# Patient Record
Sex: FEMALE | Race: BLACK OR AFRICAN AMERICAN | Employment: FULL TIME | URBAN - METROPOLITAN AREA
[De-identification: names, ages, dates, MRNs, and addresses within clinical notes are randomized per-mention and may not be internally consistent; named-entity substitution may affect disease eponyms.]

---

## 2019-02-11 ENCOUNTER — OFFICE VISIT (OUTPATIENT)
Dept: FAMILY MEDICINE CLINIC | Age: 44
End: 2019-02-11

## 2019-02-11 VITALS
DIASTOLIC BLOOD PRESSURE: 77 MMHG | RESPIRATION RATE: 16 BRPM | BODY MASS INDEX: 22.13 KG/M2 | HEIGHT: 68 IN | WEIGHT: 146 LBS | SYSTOLIC BLOOD PRESSURE: 118 MMHG | OXYGEN SATURATION: 99 % | HEART RATE: 80 BPM | TEMPERATURE: 97.8 F

## 2019-02-11 DIAGNOSIS — Z13.21 SCREENING FOR ENDOCRINE, NUTRITIONAL, METABOLIC AND IMMUNITY DISORDER: ICD-10-CM

## 2019-02-11 DIAGNOSIS — R53.82 CHRONIC FATIGUE: ICD-10-CM

## 2019-02-11 DIAGNOSIS — G89.29 CHRONIC PAIN OF BOTH KNEES: ICD-10-CM

## 2019-02-11 DIAGNOSIS — M22.2X2 PATELLOFEMORAL DISORDER OF BOTH KNEES: ICD-10-CM

## 2019-02-11 DIAGNOSIS — M54.50 CHRONIC LEFT-SIDED LOW BACK PAIN WITHOUT SCIATICA: ICD-10-CM

## 2019-02-11 DIAGNOSIS — G89.29 CHRONIC LEFT-SIDED LOW BACK PAIN WITHOUT SCIATICA: ICD-10-CM

## 2019-02-11 DIAGNOSIS — M22.2X1 PATELLOFEMORAL DISORDER OF BOTH KNEES: ICD-10-CM

## 2019-02-11 DIAGNOSIS — Z00.00 PHYSICAL EXAM: ICD-10-CM

## 2019-02-11 DIAGNOSIS — M25.562 CHRONIC PAIN OF BOTH KNEES: ICD-10-CM

## 2019-02-11 DIAGNOSIS — Z13.29 SCREENING FOR ENDOCRINE, NUTRITIONAL, METABOLIC AND IMMUNITY DISORDER: ICD-10-CM

## 2019-02-11 DIAGNOSIS — E55.9 VITAMIN D DEFICIENCY: Primary | ICD-10-CM

## 2019-02-11 DIAGNOSIS — D50.9 IRON DEFICIENCY ANEMIA, UNSPECIFIED IRON DEFICIENCY ANEMIA TYPE: ICD-10-CM

## 2019-02-11 DIAGNOSIS — Z13.228 SCREENING FOR ENDOCRINE, NUTRITIONAL, METABOLIC AND IMMUNITY DISORDER: ICD-10-CM

## 2019-02-11 DIAGNOSIS — Z13.0 SCREENING FOR ENDOCRINE, NUTRITIONAL, METABOLIC AND IMMUNITY DISORDER: ICD-10-CM

## 2019-02-11 DIAGNOSIS — M25.561 CHRONIC PAIN OF BOTH KNEES: ICD-10-CM

## 2019-02-11 NOTE — PROGRESS NOTES
HISTORY OF PRESENT ILLNESS Danielle Nicholas is a 37 y.o. female. Patient is here to establish care. Patient recently [facility] and mamm[facility]. 
She mentions she would like to have annual exam as she has a new insurance. She mentions she continues to have chronic fatigue but it has gotten much better than last year after taking vitamin D. She does have a dental and eye care. She mentions she was being followed by [place]  For bilateral knee pain and was getting injections from an orthopedic specialist. She was told her knees were inoperable. Patient would like to try pool aqua therapy and referral has been placed. I will also order blood work. Establish Care The history is provided by the patient. This is a new problem. The problem occurs constantly. The problem has not changed since onset. Pertinent negatives include no chest pain, no abdominal pain, no headaches and no shortness of breath. Nothing aggravates the symptoms. She has tried nothing for the symptoms. Knee Pain The history is provided by the patient. This is a chronic problem. The problem occurs constantly. The problem has been gradually worsening. Pertinent negatives include no chest pain, no abdominal pain, no headaches and no shortness of breath. Review of Systems Constitutional: Positive for malaise/fatigue. Negative for chills and fever. HENT: Negative for congestion, ear discharge, ear pain, hearing loss and sore throat. Eyes: Negative for blurred vision, double vision, pain and discharge. Respiratory: Negative for cough, sputum production, shortness of breath and wheezing. Cardiovascular: Negative for chest pain, palpitations and leg swelling. Gastrointestinal: Negative for abdominal pain, blood in stool, constipation, nausea and vomiting. Genitourinary: Negative for dysuria, frequency and hematuria. Musculoskeletal: Negative for back pain and myalgias. Neurological: Negative for tingling, tremors, focal weakness, weakness and headaches. Endo/Heme/Allergies: Negative for environmental allergies. Psychiatric/Behavioral: Negative for depression. The patient is not nervous/anxious. Physical Exam  
Constitutional: She is oriented to person, place, and time. She appears well-developed and well-nourished. No distress. HENT:  
Head: Normocephalic and atraumatic. Right Ear: External ear normal.  
Left Ear: External ear normal.  
Mouth/Throat: Oropharynx is clear and moist. No oropharyngeal exudate. Eyes: EOM are normal. Pupils are equal, round, and reactive to light. No scleral icterus. Neck: Normal range of motion. No thyromegaly present. Cardiovascular: Normal rate, regular rhythm and normal heart sounds. Pulmonary/Chest: Effort normal and breath sounds normal. No respiratory distress. She has no wheezes. Abdominal: Soft. Bowel sounds are normal. She exhibits no distension. There is no tenderness. Lymphadenopathy:  
  She has no cervical adenopathy. Neurological: She is alert and oriented to person, place, and time. Psychiatric: She has a normal mood and affect. ASSESSMENT and PLAN Physical exam : 
1) Please make sure you have a routine physical exam every 1-2 years. 2) Annual check up with eye doctor and dentist. 
3) Annual mammograms for all females starting at age of 36. 
3) Self breast exam every month starting at age of 21 and above. 5) Clinical breast exam to be done every 3 years for woman between 20-30 and every year for all woman 36 and above. 6) Pap smear every 3 years starting at age 24( between 24- 27 it may be more often). At the age of 27 to 72  can switch to every 5 years with HPV screening. Woman over the age of 72 with regular cervical cancer testing with normal results no longer need testing. 7) Colorectal cancer screening with colonoscopy every ten years. 8) Bone density testing starting at the age of 72. 9) Routine blood work to be ordered as part of physical exam and has been discussed with patient. 10) Screening for STD's/HIV. 11) Exercise at least 30 min 3-5 times a week for goal BMI of less than or equal to 25. 
12) Please make sure you wear a seat belt while driving daily , helmet safety discussed. 13) Please avoid smoking , alcohol and illicit drug use. 14) Daily requirement of calcium is 1200 mg per day and 1000 IU of vitamin D. 
15) Please make sure all immunizations are up to date: - Influenza vaccine every year  
     - Tdap every 10 years - Pneumococcal vaccine starting at age of 72 
     - Shingles at age 61 Bilateral knee pain : 
- Please sign release for old records 
- referral to physical therapy Chronic fatigue: 
- Patient will return on Friday for blood work

## 2019-02-15 ENCOUNTER — HOSPITAL ENCOUNTER (OUTPATIENT)
Dept: LAB | Age: 44
Discharge: HOME | End: 2019-02-15
Payer: COMMERCIAL

## 2019-02-15 DIAGNOSIS — Z13.228 SCREENING FOR ENDOCRINE, NUTRITIONAL, METABOLIC AND IMMUNITY DISORDER: ICD-10-CM

## 2019-02-15 DIAGNOSIS — E55.9 VITAMIN D DEFICIENCY: ICD-10-CM

## 2019-02-15 DIAGNOSIS — Z13.29 SCREENING FOR ENDOCRINE, NUTRITIONAL, METABOLIC AND IMMUNITY DISORDER: ICD-10-CM

## 2019-02-15 DIAGNOSIS — Z13.21 SCREENING FOR ENDOCRINE, NUTRITIONAL, METABOLIC AND IMMUNITY DISORDER: ICD-10-CM

## 2019-02-15 DIAGNOSIS — D50.9 IRON DEFICIENCY ANEMIA, UNSPECIFIED IRON DEFICIENCY ANEMIA TYPE: ICD-10-CM

## 2019-02-15 DIAGNOSIS — R53.82 CHRONIC FATIGUE: ICD-10-CM

## 2019-02-15 DIAGNOSIS — Z13.0 SCREENING FOR ENDOCRINE, NUTRITIONAL, METABOLIC AND IMMUNITY DISORDER: ICD-10-CM

## 2019-02-15 LAB
ALBUMIN SERPL-MCNC: 3.7 G/DL (ref 3.4–5)
ALBUMIN/GLOB SERPL: 0.8 {RATIO} (ref 0.8–1.7)
ALP SERPL-CCNC: 113 U/L (ref 45–117)
ALT SERPL-CCNC: 25 U/L (ref 13–56)
ANION GAP SERPL CALC-SCNC: 9 MMOL/L (ref 3–18)
AST SERPL-CCNC: 20 U/L (ref 15–37)
BASOPHILS # BLD: 0 K/UL (ref 0–0.1)
BASOPHILS NFR BLD: 0 % (ref 0–2)
BILIRUB SERPL-MCNC: 0.7 MG/DL (ref 0.2–1)
BUN SERPL-MCNC: 9 MG/DL (ref 7–18)
BUN/CREAT SERPL: 13 (ref 12–20)
CALCIUM SERPL-MCNC: 9.1 MG/DL (ref 8.5–10.1)
CHLORIDE SERPL-SCNC: 107 MMOL/L (ref 100–108)
CHOLEST SERPL-MCNC: 222 MG/DL
CO2 SERPL-SCNC: 26 MMOL/L (ref 21–32)
CREAT SERPL-MCNC: 0.72 MG/DL (ref 0.6–1.3)
DIFFERENTIAL METHOD BLD: ABNORMAL
EOSINOPHIL # BLD: 0.1 K/UL (ref 0–0.4)
EOSINOPHIL NFR BLD: 4 % (ref 0–5)
ERYTHROCYTE [DISTWIDTH] IN BLOOD BY AUTOMATED COUNT: 14.2 % (ref 11.6–14.5)
GLOBULIN SER CALC-MCNC: 4.5 G/DL (ref 2–4)
GLUCOSE SERPL-MCNC: 81 MG/DL (ref 74–99)
HCT VFR BLD AUTO: 41.5 % (ref 35–45)
HDLC SERPL-MCNC: 63 MG/DL (ref 40–60)
HDLC SERPL: 3.5 {RATIO} (ref 0–5)
HGB BLD-MCNC: 12.7 G/DL (ref 12–16)
IRON SATN MFR SERPL: 27 %
IRON SERPL-MCNC: 86 UG/DL (ref 50–175)
LDLC SERPL CALC-MCNC: 142.4 MG/DL (ref 0–100)
LIPID PROFILE,FLP: ABNORMAL
LYMPHOCYTES # BLD: 1.1 K/UL (ref 0.9–3.6)
LYMPHOCYTES NFR BLD: 40 % (ref 21–52)
MCH RBC QN AUTO: 27.9 PG (ref 24–34)
MCHC RBC AUTO-ENTMCNC: 30.6 G/DL (ref 31–37)
MCV RBC AUTO: 91 FL (ref 74–97)
MONOCYTES # BLD: 0.2 K/UL (ref 0.05–1.2)
MONOCYTES NFR BLD: 9 % (ref 3–10)
NEUTS SEG # BLD: 1.3 K/UL (ref 1.8–8)
NEUTS SEG NFR BLD: 47 % (ref 40–73)
PLATELET # BLD AUTO: 274 K/UL (ref 135–420)
PMV BLD AUTO: 11.5 FL (ref 9.2–11.8)
POTASSIUM SERPL-SCNC: 4.2 MMOL/L (ref 3.5–5.5)
PROT SERPL-MCNC: 8.2 G/DL (ref 6.4–8.2)
RBC # BLD AUTO: 4.56 M/UL (ref 4.2–5.3)
SODIUM SERPL-SCNC: 142 MMOL/L (ref 136–145)
TIBC SERPL-MCNC: 320 UG/DL (ref 250–450)
TRIGL SERPL-MCNC: 83 MG/DL (ref ?–150)
TSH SERPL DL<=0.05 MIU/L-ACNC: 2.9 UIU/ML (ref 0.36–3.74)
VIT B12 SERPL-MCNC: 738 PG/ML (ref 211–911)
VLDLC SERPL CALC-MCNC: 16.6 MG/DL
WBC # BLD AUTO: 2.7 K/UL (ref 4.6–13.2)

## 2019-02-15 PROCEDURE — 80053 COMPREHEN METABOLIC PANEL: CPT

## 2019-02-15 PROCEDURE — 82607 VITAMIN B-12: CPT

## 2019-02-15 PROCEDURE — 82306 VITAMIN D 25 HYDROXY: CPT

## 2019-02-15 PROCEDURE — 84443 ASSAY THYROID STIM HORMONE: CPT

## 2019-02-15 PROCEDURE — 80061 LIPID PANEL: CPT

## 2019-02-15 PROCEDURE — 85025 COMPLETE CBC W/AUTO DIFF WBC: CPT

## 2019-02-15 PROCEDURE — 83540 ASSAY OF IRON: CPT

## 2019-02-16 LAB — 25(OH)D3 SERPL-MCNC: 75.7 NG/ML (ref 30–100)

## 2019-02-19 ENCOUNTER — HOSPITAL ENCOUNTER (OUTPATIENT)
Dept: PHYSICAL THERAPY | Age: 44
Discharge: HOME | End: 2019-02-19
Payer: COMMERCIAL

## 2019-02-19 PROCEDURE — 97162 PT EVAL MOD COMPLEX 30 MIN: CPT

## 2019-02-19 NOTE — PROGRESS NOTES
[address] PHYSICAL THERAPY  [address] [place] [address] [address], [place], [address] [employer] [address] - Phone: [phone]  Fax: [phone] / [facility]  Patient Name: Nicki Halsted : 1975   Medical   Diagnosis: Bilateral knee pain [M25.561, M25.562]  Chronic left-sided low back pain without sciatia [M54.5, G89.29] Treatment Diagnosis: Bilateral knee pain [M25.561, M25.562]  Chronic left-sided low back pain without sciatia [M54.5, G89.29]   Onset Date: Chronic - B knee pain; 6+ months - left-sided low back pain     Referral Source: Brianna Villalpando MD [facility]): 2019   Prior Hospitalization: See medical history Provider #: [id number]   Prior Level of Function: Independent with ADLs, ambulation; working only 6 hours per day due to B knee pain   Comorbidities: Umbilical hernia, h/o concussion    Medications: Verified on Patient Summary List   The Plan of Care and following information is based on the information from the initial evaluation.   ===========================================================================================  Assessment / key information:  Patient is a 37 y.o. female who presents with complaints of chronic B knee pain (since ) and recent onset left-sided low back pain (6-8 months ago). Patient demonstrates decreased L/S flexion ROM, decreased B quad/hip flexor flexibility, decreased B hip strength, B patellar hypermobility/crepitus, decreased position/activity tolerance and impaired functional mobility/gait. Patient would benefit from skilled PT services to address these issues and improve function.   Thank you for this referral.    FOTO: 53/100, stage 5, independent community ambulator (knee); 69/100 (stage 4, little difficulty performing usual work or household activities and hobbies)   ==========================================================================================  Eval Complexity: History: MEDIUM  Complexity : 1-2 comorbidities / personal factors will impact the outcome/ POC Exam:HIGH Complexity : 4+ Standardized tests and measures addressing body structure, function, activity limitation and / or participation in recreation  Presentation: MEDIUM Complexity : Evolving with changing characteristics  Clinical Decision Making:MEDIUM Complexity : FOTO score of 26-74Overall Complexity:MEDIUM    Problem List: pain affecting function, decrease ROM, decrease strength, edema affecting function, impaired gait/ balance, decrease ADL/ functional abilitiies, decrease activity tolerance, decrease flexibility/ joint mobility and decrease transfer abilities   Treatment Plan may include any combination of the following: Therapeutic exercise, Therapeutic activities, Neuromuscular re-education, Physical agent/modality, Gait/balance training, Manual therapy, Aquatic therapy, Patient education, Functional mobility training and Stair training  Patient / Family readiness to learn indicated by: asking questions, trying to perform skills and interest  Persons(s) to be included in education: patient (P)  Barriers to Learning/Limitations: yes;  other unable to initiate PT until 3/7/2019 due to patient's work schedule  Measures taken:    Patient Goal (s): \"Some improvement for daily living activities in range of motion and pain. \"   Patient self reported health status: good  Rehabilitation Potential: fair   Short Term Goals: To be accomplished in  2  weeks:  1. Patient will demonstrate compliance with aquatic therapy program.  2. Patient will report less than or equal to 4/10 pain at worst during aquatic therapy to allow increased activity tolerance.  Long Term Goals: To be accomplished in  4  weeks:  1. Patient will demonstrate independence with aquatic therapy program.  2. Patient will score greater than or equal to 71/100 on FOTO (lumbar spine) to indicate improved function.   3. Patient will score greater than or equal to 61/100 on FOTO (knee) to indicate improved function. Frequency / Duration:   Patient to be seen  2-3  times per week for 4  weeks:  Patient / Caregiver education and instruction: other pool rules    Therapist Signature: Kalee Jaime PT Date: 5/50/8957   Certification Period: NA Time: 12:07 PM   ===========================================================================================  I certify that the above Physical Therapy Services are being furnished while the patient is under my care. I agree with the treatment plan and certify that this therapy is necessary. Physician Signature:        Date:       Time:     Please sign and return to [employer] or you may fax the signed copy to [phone]. Thank you.

## 2019-02-19 NOTE — PROGRESS NOTES
PHYSICAL THERAPY - DAILY TREATMENT NOTE    Patient Name: Ana Valencia        Date: 2019  : 1975   YES Patient  Verified  Visit #:   1     Insurance: Payor: Otf Vieyra / Plan: [id number] / Product Type: Managed Care Medicaid /      In time: 8:35 Out time: 9:30   Total Treatment Time: 55     Medicare/[employer] [place] Time Tracking (below)   Total Timed Codes (min):  NA 1:1 Treatment Time:  NA     TREATMENT AREA =  B knee pain, LBP    SUBJECTIVE    Pain Level (on 0 to 10 scale):  6  / 10 right knee, 3/10 left knee   Medication Changes/New allergies or changes in medical history, any new surgeries or procedures? NO    If yes, update Summary List   Subjective Functional Status/Changes:  []  No changes reported     Pt reports B knee pain, limited ROM (especially with bending/stooping), since she was in [employer] (began 1998). Pt reports no specific injury. Pt reports that her status was dropped due to slow running, which she attributes to her patellofemoral disorder. Pt reports difficulty standing, unable to work longer than 6 hours due to pain. Pt reports that pain is constant, but is worse with the more she tries to do, especially bending. Pt reports that she received injections right knee at [facility]. Pt reports that she was told that right knee was worse on x-ray, but pain is worse in left knee. Pt reports that she was told that she was not a candidate for surgery because of the shape of her knees. Pt reports that aquatherapy was recommended. Pt reports that she also has left-sided LBP, which began in last 6-8 months. Pt denies pain radiating down left LE, denies numbness/tingling. Pt reports that LBP is mainly tenderness to touch. OBJECTIVE    Physical Therapy Evaluation - Lumbar Spine ([facility])    SUBJECTIVE  Chief Complaint: See above    Mechanism of injury: No known injury    Symptoms:  Pain rating (0-10):    Today: 0   Best: 0   Worst: 5-6    [] Constant:    [x] Intermittent: Left LBP     Aggravated by:   [x] Bending [x] Sitting [x] Standing (prolonged) [] Walking   [] Moving [] Cough [] Sneeze [] Valsalva   [] AM  [x] PM  Lying:  [] sup   [] pro   [x] sidelying (left) [] Other:     Eased by:    [] Bending [] Sitting [] Standing [x] Walking   [x] Moving [] AM  [] PM  Lying: [] sup  [] pro  [] sidelying   [] Other:     General Health:  Red Flags Indicated? [] Yes    [x] No  [] Yes [] No Recent weight change (If yes, due to dieting?  [] Yes  [] No)  [] Yes [] No Weakness in legs during walking  [] Yes [] No Unremitting pain at night  [] Yes [] No Abdominal pain or problems  [] Yes [] No Rectal bleeding  [] Yes [] No Feet more cold or painful in cold weather  [] Yes [] No Menstrual irregularities  [] Yes [] No Blood or pain with urination  [] Yes [] No Dysfunction of bowel or bladder  [] Yes [] No Recent illness within past 3 weeks (i.e, cold, flu)  [] Yes [] No Numbness/tingling in buttock/genitalia region    Past History/Treatments: None    Diagnostic Tests: [] Lab work [] X-rays    [] CT [] MRI     [] Other:  Results: None    Functional Status  Prior level of function: Independent with ADLs, ambulation; working 6 hours/day (limited due to knee pain)  Present functional limitations: Prolonged standing (limited due to knee pain)  What position do you sleep in?: Supine    OBJECTIVE  Posture:  Lateral Shift: [] R    [] L     [] +  [x] -  Kyphosis: [] Increased [] Decreased   [x]  WNL  Lordosis:  [] Increased [] Decreased   [x] WNL  Pelvic symmetry: [] WNL    [x] Other: Left ASIS higher, left LE shorter in supine; left PSIS higher in prone  + scoliosis    Gait:  [] Normal     [x] Abnormal: Decreased gait speed    Active Movements: [] N/A   [] Too acute   [] Other:  ROM % AROM % PROM Comments:pain, area   Forward flexion 40-60 75%  Incr LBP   Extension 20-30 WNL  Incr LBP   SB right 20-30 WNL  Incr left LBP   SB left 20-30 WNL  Incr right LBP   Rotation right 5-10 WNL  Incr left LBP   Rotation left 5-10 WNL  NE     Repeated Movements   Effects on present pain: produces (DC), abolishes (A), increases (incr), decreases (decr), centralizes (C), peripheral (PH), no effect (NE)   Pre-Test Sx Flexion Repeated Flexion Extension Repeated Extension Repeated SBL Repeated SBR   Sitting          Standing          Lying      N/A N/A   Comments:  Side Glide:  Sustained passive positioning test:    Neuro Screen [x] WNL  Myotome/Dermatome/Reflexes:  Comments:    Dural Mobility:  SLR Sitting: [] R    [] L    [] +    [] -  @ (degrees):           Supine: [] R    [] L    [] +    [x] -  @ (degrees):   Slump Test: [] R    [] L    [] +    [] -  @ (degrees):   Prone Knee Bend: [] R    [] L    [] +    [] -     Palpation  [x] Min  [] Mod  [] Severe    Location: Left L/S paraspinals  [] Min  [] Mod  [] Severe    Location:  [] Min  [] Mod  [] Severe    Location:    Stabilization Tests  Multifidus Test  Level 1: Prone abdominal draw in (Goal 6-10mmHG):  Level 2: Supported leg load supine (needle deflection at 40mmHG): [] Yes  [] No   Level 3: Unsupported leg load supine (needle deflection at 40mmHG): [] Yes  [] No     Strength   L(0-5) R (0-5) N/T   Hip Flexion (L1,2) 4 4 []   Knee Extension (L3,4) 5 5 []   Ankle Dorsiflexion (L4) 5 5 []   Great Toe Extension (L5) 5 5 []   Ankle Plantarflexion (S1) 5 5 []   Knee Flexion (S1,2) 5 5 []   Upper Abdominals   [x]   Lower Abdominals   [x]   Paraspinals   [x]   Back Rotators   [x]   Gluteus Jose 4- 4- []   Other (Gluteus Medius) 4- 4- []     Special Tests  Lumbar:  Lumb.  Compression: [] Pos  [] Neg               Lumbar Distraction:   [] Pos  [] Neg    Quadrant:  [] Pos  [] Neg   [] Flex  [] Ext    Sacroilliac:  Gaenslen's: [] R    [] L    [] +    [] -     Compression: [] +    [] -     Gapping:  [] +    [] -     Thigh Thrust: [] R    [] L    [] +    [] -     Leg Length: [] +    [] - Position:    Crests:    ASIS:    PSIS:    Sacral Sulcus:    Mobility: Standing flex:     Sitting flex:     Supine to sit:     Prone knee bend:         Hip: Valiant Maupin:  [] R    [] L    [] +    [] -     Scour:  [] R    [] L    [] +    [] -     Piriformis: [] R    [] L    [] +    [] -          Deficits: Shlomo's: [] R    [] L    [] +    [x] -     Jacquetta Rubins: [] R    [] L    [x] +    [] -     Hamstrings 90/90: WNL    Gastrocsoleus (to neutral): Right: WNL Left: WNL       Global Muscular Weakness:  Abdominals:  Quadratus Lumborum:  Paraspinals:   Other:    Other tests/comments:    Physical Therapy Evaluation - Knee    ROM / Strength  [] Unable to assess                  AROM                      PROM                   Strength (1-5)    Left Right Left Right Left Right   Hip Flexion [facility] WFL   4 4    Extension [facility]   4- 4-    Abduction WFL WFL   4- 4-    Adduction WFL WFL   NT NT   Knee Flexion 140 140   5 5    Extension 0 0   5 5   Ankle Plantarflexion WFL WFL   5 5    Dorsiflexion WFL WFL   5 5       Flexibility: [] Unable to assess at this time  Hamstrings:    (L) Tightness= [x] WNL   [] Min   [] Mod   [] Severe    (R) Tightness= [x] WNL   [] Min   [] Mod   [] Severe  Quadriceps:    (L) Tightness= [x] WNL   [] Min   [] Mod   [] Severe    (R) Tightness= [x] WNL   [] Min   [] Mod   [] Severe  Gastroc:      (L) Tightness= [x] WNL   [] Min   [] Mod   [] Severe    (R) Tightness= [x] WNL   [] Min   [] Mod   [] Severe  Other:    Palpation:   Neg/Pos  Neg/Pos  Neg/Pos   Joint Line - Quad tendon - Patellar ligament -   Patella + R Fibular head - Pes Anserinus -   Tibial tubercle - Hamstring tendons - Infrapatellar fat pad -     Optional Tests:  Patellar Positioning (Static)   []L []R Normal []L []R Lateral   []L []R Jackson Ditto      []L []R Medial   []L []R Baja    Patellar Tracking   []L []R Glide (Lat)   []L []R Tilt (Lat)     []L []R Glide (Med)  []L []R Tilt (Med)      []L []R Tile (Inf)     Patellar Mobility   [x]L [x]R Hypermobile []L []R Hypomobile         Girth Measurements:     Cm at  Cm above joint line   Cm at   Cm below joint line  Cm at joint line   Left        Right           Lachmans  [] Neg    [] Pos Posterior Drawer [] Neg    [] Pos  Pivot Shift  [] Neg    [] Pos Posterior Sag  [] Neg    [] Pos  YASMEEN   [] Neg    [] Pos Jackie's Test [] Neg    [] Pos  ALRI   [] Neg    [] Pos Squat   [] Neg    [] Pos  Valgus@ 0 Degrees [] Neg    [] Pos Maxime-Pratik [] Neg    [] Pos  Valgus@ 30 Degrees [] Neg    [] Pos Patellar Apprehension [] Neg    [] Pos  Varus@ 0 Degrees [] Neg    [] Pos Tucker's Compression [] Neg    [] Pos  Varus@ 30 Degrees [] Neg    [] Pos Ely's Test  [] Neg    [] Pos  Apley's Compression [] Neg    [] Pos Shlomo's Test  [] Neg    [] Pos  Apley's Distraction [] Neg    [] Pos Stroke Test  [] Neg    [] Pos   Anterior Drawer [] Neg    [] Pos Fluctuation Test [] Neg    [] Pos  Other:                  [] Neg    [] Pos                 Other tests/comments:     During eval min Patient Education:  NO  Reviewed HEP   []  Progressed/Changed HEP based on:   Reviewed pool rules     Other Objective/Functional Measures:    See eval     Post Treatment Pain Level (on 0 to 10) scale:   6  / 10 right knee, 3/10 left knee     ASSESSMENT    Assessment/Changes in Function:     See plan of care    Justification for Eval Code Complexity:  Patient History : MEDIUM - umbilical hernia, h/o concussion  Examination HIGH - See objective  Clinical Presentation: MEDIUM  Clinical Decision Making : MEDIUM - FOTO 69/100 (lumbar spine), 53/100 (knee)     []  See Progress Note/Recertification   Patient will continue to benefit from skilled PT services: see plan of care   Progress toward goals / Updated goals:    See plan of care     PLAN    [x]  Upgrade activities as tolerated YES Continue plan of care   []  Discharge due to :    []  Other:      Therapist: Stan Suero PT    Date: 2/19/2019 Time: 8:42 AM

## 2019-02-21 ENCOUNTER — TELEPHONE (OUTPATIENT)
Dept: FAMILY MEDICINE CLINIC | Age: 44
End: 2019-02-21

## 2019-03-07 ENCOUNTER — HOSPITAL ENCOUNTER (OUTPATIENT)
Dept: PHYSICAL THERAPY | Age: 44
Discharge: HOME | End: 2019-03-07
Payer: COMMERCIAL

## 2019-03-07 PROCEDURE — 97113 AQUATIC THERAPY/EXERCISES: CPT

## 2019-03-07 NOTE — PROGRESS NOTES
PHYSICAL THERAPY - DAILY TREATMENT NOTE - AQUATICS    Patient Name: Theresa Cea        Date: 3/7/2019  : 1975   YES Patient  Verified  Visit #:   2     Insurance: Payor: Otf Vieyra / Plan: [id number] / Product Type: Managed Care Medicaid /      In time: 158 Out time: 252   Total Treatment Time: 54     Medicare/[facility] Time Tracking (below)   Total Timed Codes (min):  NA 1:1 Treatment Time:  Na     TREATMENT AREA =  Bilateral knee pain [M25.561, M25.562]  Chronic left-sided low back pain without sciatia [M54.5, G89.29]    SUBJECTIVE    Pain Level (on 0 to 10 scale):  5  / 10   Medication Changes/New allergies or changes in medical history, any new surgeries or procedures? NO    If yes, update Summary List   Subjective Functional Status/Changes:  []  No changes reported     Pt arrive to therapy early and was provided a tour of pool before session. She reports 5/10 pain today in her knees primarily. OBJECTIVE      52 min Therapeutic Exercise:  [x]  AQUATIC THERAPY   Rationale:      increase strength, increase ROM, and improve balance to improve the patients ability to perform ADL's and ambulate with less pain and increase activity tolerance.         [x]   Patient Education:  Continue Aquatic Therapy and HEP as instructed          UE exercise resistance:                                                  LE exercises:                                                                 [] none                                                                             []  thera-band resistance       [] small water weights                                                   [] no UE support       [x] medium water weights                                              []  1 UE support        [] large water weights                                                   [x]  2 UE support          [] back supported on wall       [] thera-band      SLS UE support: [x] both UE                          [] 1 UE       [] 1 finger/fingers       []  none       [] EC   Post Treatment Pain Level (on 0 to 10) scale:   5  / 10     ASSESSMENT    Assessment/Changes in Function:     Patient tolerated initiaton of aquatic program well without increases in pain after session. []  See Progress Note/Recertification   Patient will continue to benefit from skilled PT services to analyze,, cue,, progress,, modify,, demonstrate,, instruct, and address, movement patterns,, therapeutic interventions,, postural abnormalities,, soft tissue restrictions,, ROM,, strength,, functional mobility,, body mechanics/ergonomics, and home and community integration, to attain remaining goals. Progress toward goals / Updated goals:    1st session since initial evaluation, no notable progress yet.      PLAN    []  Upgrade activities as tolerated YES Continue plan of care   []  Discharge due to :    []  Other:      Therapist: Sumanth Terrazas DPT    Date: 3/7/2019 Time: 3:02 PM     Future Appointments   Date Time Provider Naeem Ramírez   3/8/2019 11:00 AM [facility]   3/14/2019  2:00 PM [address] [place] [address]   3/15/2019 11:00 AM [facility]   3/21/2019  2:00 PM [address] [place] [address]   3/22/2019 11:00 AM [facility]   3/28/2019  2:00 PM [address] [place] [address]   3/29/2019 11:00 AM [facility]

## 2019-03-08 ENCOUNTER — HOSPITAL ENCOUNTER (OUTPATIENT)
Dept: PHYSICAL THERAPY | Age: 44
Discharge: HOME | End: 2019-03-08
Payer: COMMERCIAL

## 2019-03-08 PROCEDURE — 97113 AQUATIC THERAPY/EXERCISES: CPT

## 2019-03-08 NOTE — PROGRESS NOTES
PHYSICAL THERAPY - DAILY TREATMENT NOTE - AQUATICS    Patient Name: Sukhdeep Lilly        Date: 3/8/2019  : 1975   YES Patient  Verified  Visit #:   3     Insurance: Payor: Otf Vieyra / Plan: [address] / Product Type: Managed Care Medicaid /      In time: 11:15 Out time: 11:55   Total Treatment Time: 40     Medicare/BCBS [place] Time Tracking (below)   Total Timed Codes (min):  na 1:1 Treatment Time:  na     TREATMENT AREA =  Bilateral knee pain [M25.561, M25.562]  Chronic left-sided low back pain without sciatia [M54.5, G89.29]    SUBJECTIVE    Pain Level (on 0 to 10 scale):  5  / 10   Medication Changes/New allergies or changes in medical history, any new surgeries or procedures? NO    If yes, update Summary List   Subjective Functional Status/Changes:  []  No changes reported     Pt reports feeling okay after starting aquatic PT yesterday. Pt states she got a flat tire that is why she is late. OBJECTIVE  40  (25) min Therapeutic Exercise:   [x]  Aquatic Therapy   Rationale:      increase strength, increase ROM, and improve balance to improve the patients ability to perform ADL's and ambulate with less pain and increase activity tolerance.       [x]   Patient Education:  Continue Aquatic Therapy and HEP as instructed     UE exercise resistance:                                                  LE exercises:                                                                 [] none                                                                             []  thera-band resistance       [] small water weights                                                   [] no UE support       [x] medium water weights                                              [x]  1 UE support        [] large water weights                                                   [x]  2 UE support          [] back supported on wall       [] thera-band      SLS UE support: [] both UE                          [] 1 UE       [] 1 finger/fingers       []  none       [] EC     Post Treatment Pain Level (on 0 to 10) scale:   5  / 10     Other  Pt arrived 13' late. Mod VCing for posture during ex, especially during UE ex. Pt c/o \"burning\" right knee at end of session. ASSESSMENT    Assessment/Changes in Function:     Pt with good tolerance to aquatic ex, but no change in pain level reported. []  See Progress Note/Recertification   Patient will continue to benefit from skilled PT services to  analyze,, cue,, progress,, modify,, demonstrate,, instruct, and address, movement patterns,, therapeutic interventions,, postural abnormalities,, soft tissue restrictions,, ROM,, strength,, functional mobility,, body mechanics/ergonomics, and home and community integration, to attain remaining goals. Progress toward goals / Updated goals:    1. Patient will demonstrate compliance with aquatic therapy program. Pt has participated in 2 aquatic sessions   2. Patient will report less than or equal to 4/10 pain at worst during aquatic therapy to allow increased activity tolerance.          PLAN    []  Upgrade activities as tolerated YES Continue plan of care   []  Discharge due to :    []  Other:      Therapist: George Barnhart PTA    Date: 3/8/2019 Time: 4:05 PM     Future Appointments   Date Time Provider Naeem Ramírez   3/14/2019  2:00 PM [address] [place] [address]   3/15/2019 11:00 AM [facility]   3/21/2019  2:00 PM [address] [place] [address]   3/22/2019 11:00 AM [facility]   3/28/2019  2:00 PM [address] [place] [address]   3/29/2019 11:00 AM [facility]

## 2019-03-14 ENCOUNTER — APPOINTMENT (OUTPATIENT)
Dept: PHYSICAL THERAPY | Age: 44
End: 2019-03-14
Payer: COMMERCIAL

## 2019-03-15 ENCOUNTER — HOSPITAL ENCOUNTER (OUTPATIENT)
Dept: PHYSICAL THERAPY | Age: 44
Discharge: HOME | End: 2019-03-15
Payer: COMMERCIAL

## 2019-03-15 PROCEDURE — 97113 AQUATIC THERAPY/EXERCISES: CPT

## 2019-03-15 NOTE — PROGRESS NOTES
PHYSICAL THERAPY - DAILY TREATMENT NOTE - AQUATICS    Patient Name: Chelsi Quiroz        Date: 3/15/2019  : 1975   YES Patient  Verified  Visit #:   4     Insurance: Payor: Otf Vieyra / Plan: [address] / Product Type: Managed Care Medicaid /      In time: 11:00 Out time: 11:55   Total Treatment Time: 55     Medicare/BCBS [place] Time Tracking (below)   Total Timed Codes (min):  na 1:1 Treatment Time:  na     TREATMENT AREA =  Bilateral knee pain [M25.561, M25.562]  Chronic left-sided low back pain without sciatia [M54.5, G89.29]    SUBJECTIVE    Pain Level (on 0 to 10 scale):  2  / 10   Medication Changes/New allergies or changes in medical history, any new surgeries or procedures? NO    If yes, update Summary List   Subjective Functional Status/Changes:  []  No changes reported     \"How can I be weak? I was in the [address] [place] [address]. I guess I stopped doing it (exercise) though. \"       OBJECTIVE  55  (30) min Therapeutic Exercise:   [x]  Aquatic Therapy   Rationale:      increase strength, increase ROM, and improve balance to improve the patients ability to perform ADL's and ambulate with less pain and increase activity tolerance.       [x]   Patient Education:  Continue Aquatic Therapy and HEP as instructed     UE exercise resistance:                                                  LE exercises:                                                                 [] none                                                                             []  thera-band resistance       [] small water weights                                                   [] no UE support       [x] medium water weights                                              [x]  1 UE support        [] large water weights                                                   [x]  2 UE support          [] back supported on wall       [] thera-band      SLS UE support:                      [] both UE [] 1 UE       [] 1 finger/fingers       []  none       [] EC     Post Treatment Pain Level (on 0 to 10) scale:   2  / 10     Other  Mod VCing for posture and form with aquatic ex. No c/o pain during session. ASSESSMENT    Assessment/Changes in Function:     Pt tolerating aquatic therapeutic exercise well, but no change in pain/sx's reported. []  See Progress Note/Recertification   Patient will continue to benefit from skilled PT services to analyze,, cue,, progress,, modify,, demonstrate,, instruct, and address, movement patterns,, therapeutic interventions,, postural abnormalities,, soft tissue restrictions,, ROM,, strength,, functional mobility,, body mechanics/ergonomics, and home and community integration, to attain remaining goals. Progress toward goals / Updated goals:    1. Patient will demonstrate compliance with aquatic therapy program. Pt attending aquatics consistently. 2. Patient will report less than or equal to 4/10 pain at worst during aquatic therapy to allow increased activity tolerance. Pain has ranged from 2-5/10 past sessions      PLAN    []  Upgrade activities as tolerated YES Continue plan of care   []  Discharge due to :    []  Other:      Therapist: Татьяна Miranda PTA    Date: 3/15/2019 Time: 3:56 PM     Future Appointments   Date Time Provider Naeem Ramírez   3/21/2019  2:00 PM [address] [place] [address]   3/22/2019 11:00 AM [facility]   3/28/2019  2:00 PM [address] [place] [address]   3/29/2019 11:00 AM [facility]

## 2019-03-21 ENCOUNTER — HOSPITAL ENCOUNTER (OUTPATIENT)
Dept: PHYSICAL THERAPY | Age: 44
Discharge: HOME | End: 2019-03-21
Payer: COMMERCIAL

## 2019-03-21 PROCEDURE — 97113 AQUATIC THERAPY/EXERCISES: CPT

## 2019-03-21 NOTE — PROGRESS NOTES
PHYSICAL THERAPY - DAILY TREATMENT NOTE - AQUATICS    Patient Name: Adri Kirkland        Date: 3/21/2019  : 1975   YES Patient  Verified  Visit #:   5     Insurance: Payor: Otf Jarrell / Plan: [address] / Product Type: Managed Care Medicaid /      In time: 158 Out time: 253   Total Treatment Time: 55     Medicare/BCBS Time Tracking (below)   Total Timed Codes (min):  NA 1:1 Treatment Time:  NA     TREATMENT AREA =  Bilateral knee pain [M25.561, M25.562]  Chronic left-sided low back pain without sciatica [M54.5, G89.29]    SUBJECTIVE    Pain Level (on 0 to 10 scale):  4  / 10   Medication Changes/New allergies or changes in medical history, any new surgeries or procedures? NO    If yes, update Summary List   Subjective Functional Status/Changes:  []  No changes reported     Brant Iglesias reports that she is 85% improved since the start of care and is able to stand for longer periods of time at work. The highest her pain has been in the past 72 hours was a 4/10. She attributes much of her improvement to starting aquatic therapy. She requested to continue with aquatic therapy and agreed to potentially transition to land therapy once appropriate. OBJECTIVE      55 min Therapeutic Exercise:  [x]  AQUATIC THERAPY   Rationale:      increase strength, increase ROM, and improve balance to improve the patient's ability to perform ADL's and ambulate with less pain and increase activity tolerance. [x]   Patient Education:  Continue Aquatic Therapy and HEP as instructed          Forward, Retrograde and Lateral Walking at the beginning/end of session with No UE assistance. LE exercises performed with little UE support including heel raises, hip 3-way, mini squats, knee extensions and leg curls. UE exercises incorporated UE flexion, abduction, elbow flexion/extension, lumbo thoracic rotation and cues for engaging core musculature and maintaining upright posture. UE exercise resistance:                                                                                                             [] none/wrist weights                                                                                   [] small water weights                                                          [x] medium water weights                                                   [] large water weights                                                         [] back supported on wall  Pt required no assistance entering/exiting the pool. Post Treatment Pain Level (on 0 to 10) scale:   4  / 10     ASSESSMENT    Assessment/Changes in Function:     See PN     []  See Progress Note/Recertification   Patient will continue to benefit from skilled PT services to analyze,, cue,, progress,, modify,, demonstrate,, instruct, and address, movement patterns,, therapeutic interventions,, postural abnormalities,, soft tissue restrictions,, ROM,, strength,, functional mobility,, body mechanics/ergonomics, and home and community integration, to attain remaining goals.    Progress toward goals / Updated goals:    See PN         PLAN    []  Upgrade activities as tolerated YES Continue plan of care   []  Discharge due to :    []  Other:      Therapist: Lillis Nageotte, DPT    Date: 3/21/2019 Time: 7:25 AM     Future Appointments   Date Time Provider Naeem Ramírez   3/21/2019  2:00 PM [address] [place] [address]   3/22/2019 11:00 AM [facility]   3/28/2019  2:00 PM [address] [place] [address]   3/29/2019 11:00 AM [facility]

## 2019-03-21 NOTE — PROGRESS NOTES
[facility] PHYSICAL THERAPY  [address] [place] [address] Mayito Hicks, [address] - Phone: [phone]  Fax: [phone]  Progress Note/CONTINUED PLAN OF CARE for PHYSICAL THERAPY          Patient Name: Faye Gee : 1975   Treatment/Medical Diagnosis: Bilateral knee pain [M25.561, M25.562]  Chronic left-sided low back pain without sciatica [M54.5, G89.29]   Onset Date: Chronic - B knee pain; 6+ months - left-sided low back pain    Referral Source: Yuki Mario MD Start of Care [facility]): 2019   Prior Hospitalization: See Medical History Provider #: [id number]   Medications: Verified on Patient Summary List   Visits from [facility]: 4 Missed Visits: 1     GOALS  · Short Term Goals: To be accomplished in  2  weeks:  1. Patient will demonstrate compliance with aquatic therapy program.  MET  2. Patient will report less than or equal to 4/10 pain at worst during aquatic therapy to allow increased activity tolerance. MET: 4/10   · Long Term Goals: To be accomplished in  4  weeks:  1. Patient will demonstrate independence with aquatic therapy program.  NOT MET  2. Patient will score greater than or equal to 71/100 on FOTO (lumbar spine) to indicate improved function. NOT MET: 53/100  3. Patient will score greater than or equal to 61/100 on FOTO (knee) to indicate improved function. NOT MET: 55/100  SUMMARY OF TREATMENT  Patient's POC has consisted of therex, therapeutic activities, manual therapy prn, modalities prn, pt. education, and a comprehensive HEP. Treatment strategies used to address functional mobility deficits, ROM deficits, strength deficits, analyze and address soft tissue restrictions, analyze and cue movement patterns, analyze and modify body mechanics/ergonomics, assess and modify postural abnormalities and instruct in home and community integration.    Key Functional Changes/Progress: Shayna Pagan reports that she is 85% improved since the start of care and is able to stand for longer periods of time at work. The highest her pain has been in the past 72 hours was a 4/10. She attributes much of her improvement to starting aquatic therapy. She requested to continue with aquatic therapy and agreed to potentially transition to land therapy once appropriate. Patient Goal(s) has been updated and includes:  Same as [facility]    Goals for this certification period include and are to be achieved in   4  weeks:  1. Patient will demonstrate independence with aquatic therapy program.  2. Patient will score greater than or equal to 71/100 on FOTO (lumbar spine) to indicate improved function. 3. Patient will score greater than or equal to 61/100 on FOTO (knee) to indicate improved function. Frequency / Duration:   Patient to be seen   1-2   times per week for   4    weeks:  Assessments/Recommendations: Pt would benefit from continued physical therapy addressing functional limitations and returning her to her PLOF. If you have any questions/comments please contact us directly at [phone]. Thank you for allowing us to assist in the care of your patient. Therapist Signature: Chevy Alaniz DPT Date: 9/18/2611   Certification Period:  Reporting Period: NA  NA Time: 3:25 PM   NOTE TO PHYSICIAN:  PLEASE COMPLETE THE ORDERS BELOW AND FAX TO   [facility] Physical Therapy: [phone]. If you are unable to process this request in 24 hours please contact our office: [phone].    ___ I have read the above report and request that my patient continue as recommended.   ___ I have read the above report and request that my patient continue therapy with the following changes/special instructions: ________________________________________________   ___ I have read the above report and request that my patient be discharged from therapy.      Physician Signature:        Date:       Time:

## 2019-03-22 ENCOUNTER — HOSPITAL ENCOUNTER (OUTPATIENT)
Dept: PHYSICAL THERAPY | Age: 44
Discharge: HOME | End: 2019-03-22
Payer: COMMERCIAL

## 2019-03-22 PROCEDURE — 97113 AQUATIC THERAPY/EXERCISES: CPT

## 2019-03-22 NOTE — PROGRESS NOTES
PHYSICAL THERAPY - DAILY TREATMENT NOTE - AQUATICS    Patient Name: Sukhdeep Lilly        Date: 3/22/2019  : 1975   YES Patient  Verified  Visit #:   6     Insurance: Payor: Johnvannesajason Jarrell / Plan: [address] / Product Type: Managed Care Medicaid /      In time: 11:00 Out time: 11:50   Total Treatment Time: 50     Medicare/BCBS [place] Time Tracking (below)   Total Timed Codes (min):  na 1:1 Treatment Time:  na     TREATMENT AREA =  Bilateral knee pain [M25.561, M25.562]  Chronic left-sided low back pain without sciatica [M54.5, G89.29]    SUBJECTIVE    Pain Level (on 0 to 10 scale):  4  / 10   Medication Changes/New allergies or changes in medical history, any new surgeries or procedures? NO    If yes, update Summary List   Subjective Functional Status/Changes:  [x]  No changes reported          OBJECTIVE  50 min Therapeutic Exercise:   [x]  Aquatic Therapy   Rationale:      increase strength, increase ROM, and improve balance to improve the patients ability to perform ADL's and ambulate with less pain and increase activity tolerance.       [x]   Patient Education:  Continue Aquatic Therapy and HEP as instructed     UE exercise resistance:                                                  LE exercises:                                                                 [] none                                                                             []  thera-band resistance       [] small water weights                                                   [] no UE support       [x] medium water weights                                              [x]  1 UE support        [] large water weights                                                   [x]  2 UE support          [] back supported on wall       [] thera-band      SLS UE support:                      [] both UE                          [] 1 UE       [] 1 finger/fingers       [x]  none       [] EC     Post Treatment Pain Level (on 0 to 10) scale:   4  / 10     Other Cued pt to decrease UE support with LE ex as able to increase challenge and for core strengthening. PT required 1-2 UE support. Pt demo's imbalance with attempting SLS without UE support. Pt c/o \"burning\" in knees after getting out of water. ASSESSMENT    Assessment/Changes in Function:     Pt continues to require UE support for balance/stability with aquatic ex. []  See Progress Note/Recertification   Patient will continue to benefit from skilled PT services to analyze,, cue,, progress,, modify,, demonstrate,, instruct, and address, movement patterns,, therapeutic interventions,, postural abnormalities,, soft tissue restrictions,, ROM,, strength,, functional mobility,, body mechanics/ergonomics, and home and community integration, to attain remaining goals. Progress toward goals / Updated goals:    Updated goals from last assessment:  1. Patient will demonstrate independence with aquatic therapy program.  2. Patient will score greater than or equal to 71/100 on FOTO (lumbar spine) to indicate improved function. 3. Patient will score greater than or equal to 61/100 on FOTO (knee) to indicate improved function.      PLAN    []  Upgrade activities as tolerated YES Continue plan of care   []  Discharge due to :    []  Other:      Therapist: George Barnhart PTA    Date: 3/22/2019 Time: 4:11 PM     Future Appointments   Date Time Provider Naeem Ramírez   3/28/2019  2:00 PM [address] [place] [address]   3/29/2019 11:00 AM [facility]   4/2/2019  2:00 PM [facility]   4/4/2019  2:00 PM [address] [place] [address]   4/9/2019  2:00 PM [facility]   4/11/2019  2:00 PM [facility]   4/16/2019  2:00 PM [facility]   4/18/2019  2:00 PM [facility]   4/23/2019  2:00 PM [facility]   4/25/2019  2:00 PM [facility] 4/30/2019  2:00 PM [facility]

## 2019-03-28 ENCOUNTER — HOSPITAL ENCOUNTER (OUTPATIENT)
Dept: PHYSICAL THERAPY | Age: 44
Discharge: HOME | End: 2019-03-28
Payer: COMMERCIAL

## 2019-03-28 PROCEDURE — 97113 AQUATIC THERAPY/EXERCISES: CPT

## 2019-03-28 NOTE — PROGRESS NOTES
PHYSICAL THERAPY - DAILY TREATMENT NOTE - AQUATICS    Patient Name: Jad Vazquez        Date: 3/28/2019  : 1975   YES Patient  Verified  Visit #:     Insurance: Payor: Otf Vieyra / Plan: [address] / Product Type: Managed Care Medicaid /      In time: 157 Out time: 255   Total Treatment Time: 58     Medicare/BCBS Time Tracking (below)   Total Timed Codes (min):  NA 1:1 Treatment Time:  NA     TREATMENT AREA =  Bilateral knee pain [M25.561, M25.562]  Chronic left-sided low back pain without sciatica [M54.5, G89.29]    SUBJECTIVE    Pain Level (on 0 to 10 scale):  2  / 10   Medication Changes/New allergies or changes in medical history, any new surgeries or procedures? NO    If yes, update Summary List   Subjective Functional Status/Changes:  [x]  No changes reported   Pt stated the pool is helping. OBJECTIVE      58 min Therapeutic Exercise:  [x]  AQUATIC THERAPY   Rationale:      increase strength, increase ROM, and improve balance to improve the patient's ability to perform ADL's and ambulate with less pain and increase activity tolerance. [x]   Patient Education:  Continue Aquatic Therapy and HEP as instructed          Forward, Retrograde and Lateral Walking at the beginning/end of session with No UE assistance. LE exercises performed with intermitteny UE support including heel raises, hip 3-way, mini squats, knee extensions and leg curls. UE exercises incorporated UE flexion, abduction, elbow flexion/extension, lumbo thoracic rotation and cues for engaging core musculature and maintaining upright posture.       UE exercise resistance:                                                                                                             [] none/wrist weights                                                                                   [] small water weights                                                          [x] medium water weights                                                   [] large water weights                                                         [] back supported on wall    Dynamic Balance Assessed as good but was apprehensive towards EC SLS     SLS UE support:                      [x] both UE                          [] 1 UE       [] 1 finger/fingers       []  none       [x] EC     Pt required No assistance entering/exiting the pool. Post Treatment Pain Level (on 0 to 10) scale:   2  / 10     ASSESSMENT    Assessment/Changes in Function:     Patient tolerated treatment session well and is progressing well towards goals. []  See Progress Note/Recertification   Patient will continue to benefit from skilled PT services to analyze,, cue,, progress,, modify,, demonstrate,, instruct, and address, movement patterns,, therapeutic interventions,, postural abnormalities,, soft tissue restrictions,, ROM,, strength,, functional mobility,, body mechanics/ergonomics, and home and community integration, to attain remaining goals. Progress toward goals / Updated goals:     Moderate progress towards goals with improvements in independence     PLAN    []  Upgrade activities as tolerated YES Continue plan of care   []  Discharge due to :    []  Other:      Therapist: Vinita Ohara DPT    Date: 3/28/2019 Time: 2:52 PM     Future Appointments   Date Time Provider Naeem Ramírez   4/2/2019  2:00 PM [facility]   4/4/2019  2:00 PM [address] [place] [address]   4/9/2019  2:00 PM [facility]   4/11/2019  2:00 PM [facility]   4/16/2019  2:00 PM [facility]   4/18/2019  2:00 PM [facility]   4/23/2019  2:00 PM [facility]   4/25/2019  2:00 PM [facility]   4/30/2019  2:00 PM [facility]

## 2019-03-29 ENCOUNTER — APPOINTMENT (OUTPATIENT)
Dept: PHYSICAL THERAPY | Age: 44
End: 2019-03-29
Payer: COMMERCIAL

## 2019-04-02 ENCOUNTER — HOSPITAL ENCOUNTER (OUTPATIENT)
Dept: PHYSICAL THERAPY | Age: 44
Discharge: HOME | End: 2019-04-02
Payer: COMMERCIAL

## 2019-04-02 PROCEDURE — 97113 AQUATIC THERAPY/EXERCISES: CPT

## 2019-04-02 NOTE — PROGRESS NOTES
PHYSICAL THERAPY - DAILY TREATMENT NOTE - AQUATICS    Patient Name: Andres Quinonez        Date: 2019  : 1975   YES Patient  Verified  Visit #:   8   of   12  Insurance: Payor: Otf Vieyra / Plan: [address] / Product Type: Managed Care Medicaid /      In time: 2:00 Out time: 2:54   Total Treatment Time: 54     Medicare/BCBS [place] Time Tracking (below)   Total Timed Codes (min):  na 1:1 Treatment Time:  na     TREATMENT AREA =  Bilateral knee pain [M25.561, M25.562]  Chronic left-sided low back pain without sciatica [M54.5, G89.29]    SUBJECTIVE    Pain Level (on 0 to 10 scale):  4  / 10 B knees   Medication Changes/New allergies or changes in medical history, any new surgeries or procedures? NO    If yes, update Summary List   Subjective Functional Status/Changes:  []  No changes reported     Pt c/o pain in B knees stating \"I just got off work. \"        OBJECTIVE  54 min Therapeutic Exercise:   [x]  Aquatic Therapy   Rationale:      increase strength, increase ROM, and improve balance to improve the patients ability to perform ADL's and ambulate with less pain and increase activity tolerance.       [x]   Patient Education:  Continue Aquatic Therapy and HEP as instructed     UE exercise resistance:                                                  LE exercises:                                                                 [] none                                                                             []  thera-band resistance       [] small water weights                                                   [] no UE support       [x] medium water weights                                              [x]  1 UE support        [] large water weights                                                   [x]  2 UE support          [] back supported on wall       [] thera-band      SLS UE support:                      [] both UE                          [] 1 UE       [] 1 finger/fingers       []  none       [] EC     Post Treatment Pain Level (on 0 to 10) scale:   4  / 10     Other  Pt required min VCing for form with therapeutic exercise. Cued pt to decrease UE support as able with LE ex's to increase challenge. Pt reports imbalance without UE support. ASSESSMENT    Assessment/Changes in Function:     Pt is challenged with decreased UE support with aquatic LE ex.      []  See Progress Note/Recertification   Patient will continue to benefit from skilled PT services to analyze,, cue,, progress,, modify,, demonstrate,, instruct, and address, movement patterns,, therapeutic interventions,, postural abnormalities,, soft tissue restrictions,, ROM,, strength,, functional mobility,, body mechanics/ergonomics, and home and community integration, to attain remaining goals. Progress toward goals / Updated goals:    1. Patient will demonstrate independence with aquatic therapy program. Pt progressing toward I, min VCing for form. 2. Patient will score greater than or equal to 71/100 on FOTO (lumbar spine) to indicate improved function. 3. Patient will score greater than or equal to 61/100 on FOTO (knee) to indicate improved function.          PLAN    []  Upgrade activities as tolerated YES Continue plan of care   []  Discharge due to :    []  Other:      Therapist: Koffi Cordova PTA    Date: 4/2/2019 Time: 2:08 PM     Future Appointments   Date Time Provider Naeem Ramírez   4/4/2019  2:00 PM [address] [place] [address]   4/9/2019  2:00 PM [facility]   4/11/2019  2:00 PM [address] [place] [address]   4/16/2019  2:00 PM [facility]   4/18/2019  2:00 PM [address] [place] [address]   4/23/2019  2:00 PM [facility]   4/25/2019  2:00 PM [facility]   4/30/2019  2:00 PM [facility]

## 2019-04-04 ENCOUNTER — HOSPITAL ENCOUNTER (OUTPATIENT)
Dept: PHYSICAL THERAPY | Age: 44
Discharge: HOME | End: 2019-04-04
Payer: COMMERCIAL

## 2019-04-04 PROCEDURE — 97113 AQUATIC THERAPY/EXERCISES: CPT

## 2019-04-04 NOTE — PROGRESS NOTES
PHYSICAL THERAPY - DAILY TREATMENT NOTE - AQUATICS    Patient Name: Pat Ratliff        Date: 2019  : 1975   YES Patient  Verified  Visit #:     Insurance: Payor: Otf Vieyra / Plan: [address] / Product Type: Managed Care Medicaid /      In time: 200 Out time: 255   Total Treatment Time: 55     Medicare/BCBS Time Tracking (below)   Total Timed Codes (min):  NA 1:1 Treatment Time:  NA     TREATMENT AREA =  Bilateral knee pain [M25.561, M25.562]  Chronic left-sided low back pain without sciatica [M54.5, G89.29]    SUBJECTIVE    Pain Level (on 0 to 10 scale):  5  / 10   Medication Changes/New allergies or changes in medical history, any new surgeries or procedures? NO    If yes, update Summary List   Subjective Functional Status/Changes:  []  No changes reported     Pt reports improvement since starting therapy. OBJECTIVE      55 min Therapeutic Exercise:  [x]  AQUATIC THERAPY   Rationale:      increase strength, increase ROM, and improve balance to improve the patient's ability to perform ADL's and ambulate with less pain and increase activity tolerance. [x]   Patient Education:  Continue Aquatic Therapy and HEP as instructed          Forward, Retrograde and Lateral Walking at the beginning/end of session with No assistance. LE exercises performed with very little UE support including heel raises, hip 3-way, mini squats, knee extensions and leg curls. UE exercises incorporated UE flexion, abduction, elbow flexion/extension, lumbo thoracic rotation and cues for engaging core musculature and maintaining upright posture.       UE exercise resistance:                                                                                                             [] none/wrist weights                                                                                   [] small water weights                                                          [x] medium water weights                                                   [] large water weights                                                         [] back supported on wall    Dynamic Balance Assessed as fair+. Pt was able to perform: [x] SLS [] Tandem with: [x] EO [] EC    Pt required no assistance entering/exiting the pool. Post Treatment Pain Level (on 0 to 10) scale:   4  / 10     ASSESSMENT    Assessment/Changes in Function:     Patient tolerated treatment session well and is progressing well towards goals. Pt demonstrates fear with balance activities even though she appears to not be challenged by SLS EC.     []  See Progress Note/Recertification   Patient will continue to benefit from skilled PT services to analyze,, cue,, progress,, modify,, demonstrate,, instruct, and address, movement patterns,, therapeutic interventions,, postural abnormalities,, soft tissue restrictions,, ROM,, strength,, functional mobility,, body mechanics/ergonomics, and home and community integration, to attain remaining goals.    Progress toward goals / Updated goals:    Minimum improvement since [address]    []  Upgrade activities as tolerated YES Continue plan of care   []  Discharge due to :    []  Other:      Therapist: Dipak Garcia DPT    Date: 4/4/2019 Time: 3:01 PM     Future Appointments   Date Time Provider Naeem Ramírez   4/9/2019  2:00 PM [facility]   4/11/2019  2:00 PM [address] [place] [address]   4/16/2019  2:00 PM [facility]   4/18/2019  2:00 PM [address] [place] [address]   4/23/2019  2:00 PM [facility]   4/25/2019  2:00 PM [facility]   4/30/2019  2:00 PM [facility] hospital

## 2019-04-09 ENCOUNTER — HOSPITAL ENCOUNTER (OUTPATIENT)
Dept: PHYSICAL THERAPY | Age: 44
Discharge: HOME | End: 2019-04-09
Payer: COMMERCIAL

## 2019-04-09 PROCEDURE — 97113 AQUATIC THERAPY/EXERCISES: CPT

## 2019-04-11 ENCOUNTER — HOSPITAL ENCOUNTER (OUTPATIENT)
Dept: PHYSICAL THERAPY | Age: 44
Discharge: HOME | End: 2019-04-11
Payer: COMMERCIAL

## 2019-04-11 PROCEDURE — 97113 AQUATIC THERAPY/EXERCISES: CPT

## 2019-04-12 NOTE — PROGRESS NOTES
PHYSICAL THERAPY - DAILY TREATMENT NOTE - AQUATICS    Patient Name: Jovita Frances        Date: 2019  : 1975   YES Patient  Verified  Visit #:     Insurance: Payor: Otf Vieyra / Plan: [address] / Product Type: Managed Care Medicaid /      In time: 200 Out time: 255   Total Treatment Time: 55     Medicare/[facility] Time Tracking (below)   Total Timed Codes (min):  NA 1:1 Treatment Time:  NA     TREATMENT AREA =  Bilateral knee pain [M25.561, M25.562]  Chronic left-sided low back pain without sciatica [M54.5, G89.29]    SUBJECTIVE    Pain Level (on 0 to 10 scale):  7  / 10   Medication Changes/New allergies or changes in medical history, any new surgeries or procedures? NO    If yes, update Summary List   Subjective Functional Status/Changes:  []  No changes reported     Pt reports that she is having new radiating symptoms in to the LE        OBJECTIVE      55 min Therapeutic Exercise:  [x]  AQUATIC THERAPY   Rationale:      increase strength, increase ROM, and improve balance to improve the patient's ability to perform ADL's and ambulate with less pain and increase activity tolerance. [x]   Patient Education:  Continue Aquatic Therapy and HEP as instructed          Forward, Retrograde and Lateral Walking at the beginning/end of session with no assistance. LE exercises performed with little UE support including heel raises, hip 3-way, mini squats, knee extensions and leg curls. UE exercises incorporated UE flexion, abduction, elbow flexion/extension, lumbo thoracic rotation and cues for engaging core musculature and maintaining upright posture.       UE exercise resistance:                                                                                                             [] none/wrist weights                                                                                   [] small water weights [x] medium water weights                                                   [] large water weights                                                         [] back supported on wall    Dynamic Balance Assessed as fair. Pt was able to perform: [x] SLS [] Tandem with: [x] EO [x] EC    Pt required no assistance entering/exiting the pool. Post Treatment Pain Level (on 0 to 10) scale:   7  / 10     ASSESSMENT    Assessment/Changes in Function:     Patient tolerated treatment session well and is progressing well towards goals. Pt rarely reports pain during treatment session and is improving in independence with program.     []  See Progress Note/Recertification   Patient will continue to benefit from skilled PT services to analyze,, cue,, progress,, modify,, demonstrate,, instruct, and address, movement patterns,, therapeutic interventions,, postural abnormalities,, soft tissue restrictions,, ROM,, strength,, functional mobility,, body mechanics/ergonomics, and home and community integration, to attain remaining goals.    Progress toward goals / Updated goals:    Compliant with program, improving independence     PLAN    []  Upgrade activities as tolerated YES Continue plan of care   []  Discharge due to :    []  Other:      Therapist: Andreea Moreira DPT    Date: 4/12/2019 Time: 7:05 AM     Future Appointments   Date Time Provider Naeem Ramírez   4/16/2019  2:00 PM [facility]   4/18/2019  2:00 PM [address] [place] [address]   4/23/2019  2:00 PM [facility]   4/25/2019  2:00 PM [address] [place] [address]   4/30/2019  2:00 PM [facility]

## 2019-04-16 ENCOUNTER — HOSPITAL ENCOUNTER (OUTPATIENT)
Dept: PHYSICAL THERAPY | Age: 44
Discharge: HOME | End: 2019-04-16
Payer: COMMERCIAL

## 2019-04-16 PROCEDURE — 97113 AQUATIC THERAPY/EXERCISES: CPT

## 2019-04-16 NOTE — PROGRESS NOTES
[facility] PHYSICAL THERAPY  [address] [place] [address] Malik Hicks, [address] - Phone: [phone]  Fax: [phone]  PROGRESS NOTE  Patient Name: Claus German : 1975   Treatment/Medical Diagnosis: Bilateral knee pain [M25.561, M25.562]  Chronic left-sided low back pain without sciatica [M54.5, G89.29]   Referral Source: Stephen Ramos MD     Date of Initial Visit: 2019 Attended Visits: 12 Missed Visits: 1     SUMMARY OF TREATMENT  Pt has been participating in our aquatic therapy program which includes ambulation in water for warm-up and cool down, upper and lower extremity exercises for core stabilization and pain/sx management. CURRENT STATUS  Pt progressing slowly with therapy. Pt continues with complaints of average 4-5/10 knee and back pain with ADL's. Pt's low back Functional Summary score unchanged since last assessment indicating pt exhibits moderate difficulty performing usual work and household activities and correlates to a 47% functional limitation. Pt's knee Functional Status score unchanged since last assessment indicating is an I community ambulator and correlates to a 45% functional limitation. Pt has attended 11 aquatic therapy session and demo's I with aquatic program. Pt is interested in transitioning to land based PT to address continued strength and functional limitations. Pt to continue aquatic ex I to facilitate pain management. Goal/Measure of Progress Goal Met?   1.  1. Patient will demonstrate independence with aquatic therapy program.   Status at last Eval: compliant with aquatics  Current Status: I with aquatics  yes   2. Patient will score greater than or equal to 71/100 on FOTO (lumbar spine) to indicate improved function   Status at last Eval: 69/100 Current Status: 53/100 no   3. Patient will score greater than or equal to 61/100 on FOTO (knee) to indicate improved function.    Status at last Eval: 53/100 Current Status: 55/100 no     New Goals to be achieved in __3-4__  weeks:  1. Pt will report ability to squat for work activity with <   3/10 knee pain to indicate increased activity tolerance. 2.  Patient will score greater than or equal to 71/100 on FOTO (lumbar spine) to indicate improved function   3. Patient will score greater than or equal to 61/100 on FOTO (knee) to indicate improved function. RECOMMENDATIONS  Pt would benefit from transitioning from aquatic PT to land PT to facilitate therapeutic exercise  program and address functional limitations such as decreased ability to squat and run without increased pain. If you have any questions/comments please contact us directly at [phone]. Thank you for allowing us to assist in the care of your patient. LPTA Signature: Andrey Jo PTA  Date: 4/16/2019   PT Signature: Bautista Page DPT Time: 5:57 PM   NOTE TO PHYSICIAN:  PLEASE COMPLETE THE ORDERS BELOW AND FAX TO   [facility] [place] [facility]: [phone]. If you are unable to process this request in 24 hours please contact our office: [phone].    ___ I have read the above report and request that my patient continue as recommended.   ___ I have read the above report and request that my patient continue therapy with the following changes/special instructions:_________________________________________________________   ___ I have read the above report and request that my patient be discharged from therapy.      Physician Signature:        Date:       Time:

## 2019-04-16 NOTE — PROGRESS NOTES
PHYSICAL THERAPY - DAILY TREATMENT NOTE - AQUATICS    Patient Name: Erum Noriega        Date: 2019  : 1975   YES Patient  Verified  Visit #:     Insurance: Payor: Otf Vieyra / Plan: [address] / Product Type: Managed Care Medicaid /      In time: 2:00 Out time: 2:55   Total Treatment Time: 55     Medicare/BCBS [place] Time Tracking (below)   Total Timed Codes (min):  na 1:1 Treatment Time:  na     TREATMENT AREA =  Bilateral knee pain [M25.561, M25.562]  Chronic left-sided low back pain without sciatica [M54.5, G89.29]    SUBJECTIVE    Pain Level (on 0 to 10 scale):  4  / 10   Medication Changes/New allergies or changes in medical history, any new surgeries or procedures? NO    If yes, update Summary List   Subjective Functional Status/Changes:  []  No changes reported     Pt reports her knees bother her when she has to squat down at work. Pt states her apartment complex has a pool so she will be able to use that during the summer for her water therapy. OBJECTIVE  55  (25) min Therapeutic Exercise:   [x]  Aquatic Therapy   Rationale:      increase strength, increase ROM, and improve balance to improve the patients ability to perform ADL's and ambulate with less pain and increase activity tolerance.       [x]   Patient Education:  Continue Aquatic Therapy and HEP as instructed     UE exercise resistance:                                                  LE exercises:                                                                 [] none                                                                             []  thera-band resistance       [] small water weights                                                   [] no UE support       [x] medium water weights                                              [x]  1 UE support        [] large water weights                                                   [x]  2 UE support          [] back supported on wall       [] thera-band      SLS UE support:                      [] both UE                          [] 1 UE       [] 1 finger/fingers       []  none       [] EC     Post Treatment Pain Level (on 0 to 10) scale:   4  / 10     Other  FOTO (L/S):53/100   FOTO (knees): 55/100      Pt demo's I with aquatic ex program.     Discussed transition to land based PT. Pt reports lives in [place] and would like to transfer to clinic closer since she is no longer doing aquatic PT.     ASSESSMENT    Assessment/Changes in Function:     Pt's L/S FOTO score unchanged since last assessment indicating pt exhibits moderate difficulty performing usual work and household activities and correlates to a 47% functional limitation. Pt's knee FOTO score unchanged since last assessment indicating is an I community ambulator and correlates to a 45% functional limitation. [x]  See Progress Note/Recertification   Patient will continue to benefit from skilled PT services to  analyze,, cue,, progress,, modify,, demonstrate,, instruct, and address, movement patterns,, therapeutic interventions,, postural abnormalities,, soft tissue restrictions,, ROM,, strength,, functional mobility,, body mechanics/ergonomics, and home and community integration, to attain remaining goals. Progress toward goals / Updated goals:    1. Patient will demonstrate independence with aquatic therapy program. MET  2. Patient will score greater than or equal to 71/100 on FOTO (lumbar spine) to indicate improved function. NOT MET= 53/100  3. Patient will score greater than or equal to 61/100 on FOTO (knee) to indicate improved function.  NOT MET=55/100       PLAN    []  Upgrade activities as tolerated YES Continue plan of care   []  Discharge due to :    []  Other:      Therapist: Elias Glaser PTA    Date: 4/16/2019 Time: 5:48 PM     Future Appointments   Date Time Provider Naeem Ramírez   4/18/2019  2:00 PM [address] [place] [address]   4/23/2019 2:00 PM [facility] to [facility]   4/25/2019  2:00 PM [facility]   4/30/2019  2:00 PM Pending sale to [facility]

## 2019-04-18 ENCOUNTER — APPOINTMENT (OUTPATIENT)
Dept: PHYSICAL THERAPY | Age: 44
End: 2019-04-18
Payer: COMMERCIAL

## 2019-04-23 ENCOUNTER — APPOINTMENT (OUTPATIENT)
Dept: PHYSICAL THERAPY | Age: 44
End: 2019-04-23
Payer: COMMERCIAL

## 2019-04-25 ENCOUNTER — APPOINTMENT (OUTPATIENT)
Dept: PHYSICAL THERAPY | Age: 44
End: 2019-04-25
Payer: COMMERCIAL

## 2019-04-30 ENCOUNTER — APPOINTMENT (OUTPATIENT)
Dept: PHYSICAL THERAPY | Age: 44
End: 2019-04-30
Payer: COMMERCIAL

## 2019-05-15 ENCOUNTER — HOSPITAL ENCOUNTER (OUTPATIENT)
Dept: PHYSICAL THERAPY | Age: 44
Discharge: HOME | End: 2019-05-15
Payer: COMMERCIAL

## 2019-05-15 PROCEDURE — 97110 THERAPEUTIC EXERCISES: CPT

## 2019-05-15 NOTE — PROGRESS NOTES
PT DAILY TREATMENT NOTE 8    Patient Name: María Elena Harris  Date:5/15/2019  : 1975  [x]  Patient  Verified  Payor: Otf [employer] / Plan: [address] / Product Type: Managed Care Medicaid /    In time:5:02  Out time:6:02  Total Treatment Time (min): 60  Total Timed Codes (min): 55  1:1 Treatment Time (min):    Visit #: 1 of 12    Treatment Area: Bilateral knee pain [M25.561, M25.562]  Chronic left-sided low back pain without sciatica [M54.5, G89.29]    SUBJECTIVE  Pain Level (0-10 scale): 4/10  Any medication changes, allergies to medications, adverse drug reactions, diagnosis change, or new procedure performed?: [x] No    [] Yes (see summary sheet for update)  Subjective functional status/changes:   [] No changes reported  I loved doing the pool therapy, but they told me that I needed to transition to land therapy to continue to get better. My left knee has been bothering me more than anything especially with the pain and clicking. OBJECTIVE      60 min Therapeutic Exercise:  [x] See flow sheet :   Rationale: increase ROM, increase strength, improve balance and increase proprioception to improve the patients ability to improve functional abilities           min Patient Education: [x] Review HEP    [] Progressed/Changed HEP based on:   [] positioning   [] body mechanics   [] transfers   [] heat/ice application        Other Objective/Functional Measures:     Pain Level (0-10 scale) post treatment: Lumbar=2/10; knees=6/10    ASSESSMENT/Changes in Function: Pt was able to tolerate transition from aquatic based therapy at other facility to land based therapy with moderate challenge today. Pt presented with chief c/o left > right knee pain and popping with minimal lumbar symptoms that have slowly been resolving.  Pt does present with fair to poor LE biomechanical symmetry with squats and mini band side steps as well as difficulty with quad strength/endurance with SLR's bilaterally. Will continue to progress/advance patient within current POC as tolerated with monitoring symptoms. Patient will continue to benefit from skilled PT services to modify and progress therapeutic interventions, address functional mobility deficits, address ROM deficits, address strength deficits, analyze and cue movement patterns, analyze and modify body mechanics/ergonomics and assess and modify postural abnormalities to attain remaining goals.      []  See Plan of Care  []  See progress note/recertification  []  See Discharge Summary         Progress towards goals / Updated goals:      PLAN  [x]  Upgrade activities as tolerated     []  Continue plan of care  []  Update interventions per flow sheet       []  Discharge due to:_  []  Other:_      Mohsen Medina, ISAAC 5/15/2019  5:02 PM

## 2019-05-22 ENCOUNTER — HOSPITAL ENCOUNTER (OUTPATIENT)
Dept: PHYSICAL THERAPY | Age: 44
Discharge: HOME | End: 2019-05-22
Payer: COMMERCIAL

## 2019-05-22 PROCEDURE — 97110 THERAPEUTIC EXERCISES: CPT

## 2019-05-22 NOTE — PROGRESS NOTES
PT DAILY TREATMENT NOTE 8    Patient Name: Chinmay Cap  Date:2019  : 1975  [x]  Patient  Verified  Payor: Otf 22 / Plan: [id number] / Product Type: Managed Care Medicaid /    In time:4:18  Out time:5:17  Total Treatment Time (min): 59  Total Timed Codes (min): 54  1:1 Treatment Time (min):    Visit #: 2 of 12    Treatment Area: Bilateral knee pain [M25.561, M25.562]  Chronic left-sided low back pain without sciatica [M54.5, G89.29]    SUBJECTIVE  Pain Level (0-10 scale): 4/10   Any medication changes, allergies to medications, adverse drug reactions, diagnosis change, or new procedure performed?: [x] No    [] Yes (see summary sheet for update)  Subjective functional status/changes:   [] No changes reported  My knees especially the left one have been bothering me more than my back since I was here last.    OBJECTIVE      59 min Therapeutic Exercise:  [x] See flow sheet :   Rationale: increase ROM, increase strength, improve balance and increase proprioception to improve the patients ability to improve functional abilities           min Patient Education: [x] Review HEP    [] Progressed/Changed HEP based on:   [] positioning   [] body mechanics   [] transfers   [] heat/ice application        Other Objective/Functional Measures:     Pain Level (0-10 scale) post treatment: 7/10    ASSESSMENT/Changes in Function: Pt presented with chief c/o left>right knee pain > lumbar pain/symptoms. Pt was challenged with maintaining pelvic symmetry with SLS bilaterally as well as moderate challenge with addition of sidelying hip adduction PRE's today. Will continue to progress/advance patient within current POC as tolerated with monitoring symptoms.     Patient will continue to benefit from skilled PT services to modify and progress therapeutic interventions, address functional mobility deficits, address ROM deficits, address strength deficits, analyze and cue movement patterns, analyze and modify body mechanics/ergonomics and assess and modify postural abnormalities to attain remaining goals.      []  See Plan of Care  []  See progress note/recertification  []  See Discharge Summary         Progress towards goals / Updated goals:      PLAN  [x]  Upgrade activities as tolerated     []  Continue plan of care  []  Update interventions per flow sheet       []  Discharge due to:_  []  Other:_      Nadine Gordon PTA 5/22/2019  4:21 PM

## 2019-05-29 ENCOUNTER — APPOINTMENT (OUTPATIENT)
Dept: PHYSICAL THERAPY | Age: 44
End: 2019-05-29
Payer: COMMERCIAL

## 2019-06-11 ENCOUNTER — HOSPITAL ENCOUNTER (OUTPATIENT)
Dept: PHYSICAL THERAPY | Age: 44
Discharge: HOME | End: 2019-06-11
Payer: COMMERCIAL

## 2019-06-11 PROCEDURE — 97110 THERAPEUTIC EXERCISES: CPT

## 2019-06-11 NOTE — PROGRESS NOTES
PT DAILY TREATMENT NOTE     Patient Name: Gilda Brizuela  Date:2019  : 1975  [x]  Patient  Verified  Payor: Otf Vieyra / Plan: [id number] / Product Type: Managed Care Medicaid /    In time:2:28  Out time:3:37  Total Treatment Time (min): 69  Total Timed Codes (min): 64  1:1 Treatment Time (min):    Visit #: 3 of 12    Treatment Area: Bilateral knee pain [M25.561, M25.562]  Chronic left-sided low back pain without sciatica [M54.5, G89.29]    SUBJECTIVE  Pain Level (0-10 scale): 3-10  Any medication changes, allergies to medications, adverse drug reactions, diagnosis change, or new procedure performed?: [x] No    [] Yes (see summary sheet for update)  Subjective functional status/changes:   [] No changes reported  My legs have mainly just been sore for the rest of the day following the therapy sessions before they clifford down again by the next day, but my lower back has been doing pretty good lately. OBJECTIVE      69 min Therapeutic Exercise:  [x] See flow sheet :   Rationale: increase ROM, increase strength, improve balance and increase proprioception to improve the patients ability to improve functional abilities           min Patient Education: [x] Review HEP    [] Progressed/Changed HEP based on:   [] positioning   [] body mechanics   [] transfers   [] heat/ice application        Other Objective/Functional Measures:     Pain Level (0-10 scale) post treatment: 5-6/10    ASSESSMENT/Changes in Function:     Patient will continue to benefit from skilled PT services to modify and progress therapeutic interventions, address functional mobility deficits, address ROM deficits, address strength deficits, analyze and cue movement patterns, analyze and modify body mechanics/ergonomics and assess and modify postural abnormalities to attain remaining goals.      []  See Plan of Care  [x]  See progress note/recertification  []  See Discharge Summary         Progress towards goals / Updated goals:  See Progress note/Physician update for full detailed progress towards established goals.     PLAN  [x]  Upgrade activities as tolerated     []  Continue plan of care  []  Update interventions per flow sheet       []  Discharge due to:_  []  Other:_      Brandon Cain PTA 6/11/2019  2:06 PM    Future Appointments   Date Time Provider Naeem Ramírez   6/11/2019  2:45 PM Beck Garcia PTA [facility]

## 2019-06-11 NOTE — PROGRESS NOTES
[address] [address] [facility] AT [address], [address] [place] [address] Road  Phone: [phone]   [phone]  PROGRESS NOTE  Patient Name: Gerardo Luciano : 1975   Treatment/Medical Diagnosis: Bilateral knee pain [M25.561, M25.562]  Chronic left-sided low back pain without sciatica [M54.5, G89.29]   Referral Source: Josey Estrella MD     Date of Initial Visit: 19 Attended Visits: 15 Missed Visits: 3     SUMMARY OF TREATMENT  Therapeutic exercise for lumbar and bilateral LE/knee focused mobility/flexibility, lumbopelvic/core stabilization, LE biomechanical symmetry, LE/knee focused strengthening and HEP. CURRENT STATUS  Patient reports approximately 30% overall improvement from therapy since initial evaluation with 3-4/10 pain level on average, increased 7-8/10 at the worst in bilateral knees left>right with prolonged standing with work duties >6 hours as well as increased repetitive squatting/bending ADL's. Pt has just been transitioned from aquatic therapy to land based therapy over the last 3 visits after performing initial 12 visits with aquatic therapy. Pt is making slow but steady progress with advancing with lumbopelvic/core stabilization as well as LE/knee focused strengthening over the past 3 visits. Pt still needs increased focus on LE biomechanical symmetry/strengthening as well as eccentric quad control/strengthening. Pt would benefit from continued therapy for 9 remaining visits left on current script to achieve maximum medical benefit/potential from current POC. Will continue to progress/advance patient within current POC as tolerated with monitoring symptoms. Knee strength= Quads=4/5 bilaterally ;Hamstrings=4-/5 bilaterally  Goal/Measure of Progress Goal Met? 1. Pt will report ability to squat for work activity with <   3/10 knee pain to indicate increased activity tolerance.     Status at last Eval: Progressing Current Status: Not Met, Pt still reports 7-8/10 pain level at the worst with increased repetitive squatting/bending ADL's.  no   2. Patient will score greater than or equal to 71/100 on FOTO (lumbar spine) to indicate improved function   Status at last Eval: 53/100 Current Status: 62/100 no   3. Patient will score greater than or equal to 61/100 on FOTO (knee) to indicate improved function. Status at last Eval: 55/100 Current Status: 36/100 no     New Goals to be achieved in __9__  treatments:  1. Pt will report ability to squat for work activity with <   3/10 knee pain to indicate increased activity tolerance. 2.  Patient will score greater than or equal to 71/100 on FOTO (lumbar spine) to indicate improved function   3. Patient will score greater than or equal to 61/100 on FOTO (knee) to indicate improved function. 4.  Pt will demonstrate =/> 4+/5 quadriceps and hamstring strength bilaterally for increased functional strength with ADL's. RECOMMENDATIONS  Continue with current POC for 9 remaining visits left on current script at a frequency of 1-2 times/week with advancing as tolerated, then reassess for the need for continuation or discharge from therapy. If you have any questions/comments please contact us directly at [phone]. Thank you for allowing us to assist in the care of your patient. LPTA Signature: Karissa Kinney PTA  Date: 6/11/2019   PT Signature: HIMANSHU Rea Time: 2:43 PM   NOTE TO PHYSICIAN:  PLEASE COMPLETE THE ORDERS BELOW AND FAX TO   [facility] at [facility] UNIT: [phone].   If you are unable to process this request in 24 hours please contact our office: [phone].    ___ I have read the above report and request that my patient continue as recommended.   ___ I have read the above report and request that my patient continue therapy with the following changes/special instructions:_________________________________________________________   ___ I have read the above report and request that my patient be discharged from therapy.      Physician Signature:        Date:       Time:

## 2019-06-25 ENCOUNTER — APPOINTMENT (OUTPATIENT)
Dept: PHYSICAL THERAPY | Age: 44
End: 2019-06-25
Payer: COMMERCIAL

## 2019-07-02 ENCOUNTER — APPOINTMENT (OUTPATIENT)
Dept: PHYSICAL THERAPY | Age: 44
End: 2019-07-02

## 2019-08-02 NOTE — PROGRESS NOTES
[address] [address] [facility] AT [address], [address] [place] [address] Road  Phone: [phone]   [phone]  DISCHARGE SUMMARY  Patient Name: Clint Nathan : 1975   Treatment/Medical Diagnosis: Bilateral knee pain [M25.561, M25.562]  Chronic left-sided low back pain without sciatica [M54.5, G89.29]   Referral Source: Vangie Trevino MD     Date of Initial Visit: 19 Attended Visits: 15 Missed Visits: 3     SUMMARY OF TREATMENT  Therapeutic exercise for lumbar and bilateral LE/knee focused mobility/flexibility, lumbopelvic/core stabilization, LE biomechanical symmetry, LE/knee focused strengthening and HEP. CURRENT STATUS  Patient reports approximately 30% overall improvement from therapy since initial evaluation with 3-4/10 pain level on average, increased 7-8/10 at the worst in bilateral knees left>right with prolonged standing with work duties >6 hours as well as increased repetitive squatting/bending ADL's. Pt has just been transitioned from aquatic therapy to land based therapy over the last 3 visits after performing initial 12 visits with aquatic therapy. Pt is making slow but steady progress with advancing with lumbopelvic/core stabilization as well as LE/knee focused strengthening over the past 3 visits. Pt still needs increased focus on LE biomechanical symmetry/strengthening as well as eccentric quad control/strengthening. Pt is being discharged from therapy at this time due to failure to return to therapy for remaining recommended visits with no further contact. Knee strength= Quads=4/5 bilaterally ;Hamstrings=4-/5 bilaterally    Goal/Measure of Progress Goal Met? 1.   Pt will report ability to squat for work activity with <   3/10 knee pain to indicate increased activity tolerance.    Status at last Eval: Progressing Current Status: Not Met, Pt still reports 7-8/10 pain level at the worst with increased repetitive squatting/bending ADL's.  no   2. Patient will score greater than or equal to 71/100 on FOTO (lumbar spine) to indicate improved function   Status at last Eval: 53/100 Current Status: 62/100 no   3. Patient will score greater than or equal to 61/100 on FOTO (knee) to indicate improved function. Status at last Eval: 55/100 Current Status: 36/100 no        RECOMMENDATIONS  Pt is being discharged from therapy at this time due to failure to return to therapy for remaining recommended visits with no further contact. If you have any questions/comments please contact us directly at [phone]. Thank you for allowing us to assist in the care of your patient. LPTA Signature: Ramez Awad PTA Date: 8/2/19   Therapist Signature: HIMANSHU Case Time: 2:20 PM     NOTE TO PHYSICIAN:  Your patient's insurance requires this discharge note be signed and returned. PLEASE COMPLETE THE ORDERS BELOW AND RETURN TO:  [facility] @ [phone]    ___ I have read the above report and request that my patient be discharged from therapy.      Physician Signature:        Date:       Time:

## 2019-08-08 ENCOUNTER — OFFICE VISIT (OUTPATIENT)
Dept: FAMILY MEDICINE CLINIC | Age: 44
End: 2019-08-08

## 2019-08-08 VITALS
WEIGHT: 144 LBS | SYSTOLIC BLOOD PRESSURE: 121 MMHG | BODY MASS INDEX: 21.82 KG/M2 | HEIGHT: 68 IN | TEMPERATURE: 97 F | DIASTOLIC BLOOD PRESSURE: 87 MMHG | OXYGEN SATURATION: 99 % | HEART RATE: 72 BPM | RESPIRATION RATE: 16 BRPM

## 2019-08-08 DIAGNOSIS — K59.00 CONSTIPATION, UNSPECIFIED CONSTIPATION TYPE: Primary | ICD-10-CM

## 2019-08-08 DIAGNOSIS — K56.41 FECAL IMPACTION (HCC): ICD-10-CM

## 2019-08-08 RX ORDER — CYCLOBENZAPRINE HCL 5 MG
5 TABLET ORAL
Qty: 30 TAB | Refills: 0 | Status: SHIPPED | OUTPATIENT
Start: 2019-08-08 | End: 2020-09-18

## 2019-08-08 NOTE — PROGRESS NOTES
HISTORY OF PRESENT ILLNESS  Marilee Morales is a 40 y.o. female. Patient is here as she was recently in the ED for constipation. She had a  X-ray abdomen which showed rectosigmoid impaction. She would like a referral to see gastroenterologist.  I have given her a list of places that she can call. She mentions her last bowel movement was yesterday and she went twice . She took a lot of lactulose and  Senokot. She has been eating better with fiber. I have discussed another x-ray of abdomen which I will order today. She also has some moles and warts. I have given her referral information for dermatology. Constipation    The history is provided by the patient. This is a recurrent problem. The stool is described as formed. Associated symptoms include abdominal pain, flatus, abdominal distention and constipation. Pertinent negatives include no dysuria, no chills, no fever, no nausea, no back pain and no vomiting. She has tried oral laxatives for the symptoms. Mole   The history is provided by the patient. This is a new problem. The problem occurs constantly. The problem has not changed since onset. Associated symptoms include abdominal pain. Pertinent negatives include no chest pain, no headaches and no shortness of breath. Associated symptoms comments: Mole on left leg. Nothing aggravates the symptoms. Nothing relieves the symptoms. She has tried nothing for the symptoms. Review of Systems   Constitutional: Negative for chills, fever and malaise/fatigue. HENT: Negative for congestion, ear discharge, ear pain, hearing loss, sinus pain and sore throat. Eyes: Negative for blurred vision, double vision, pain and discharge. Respiratory: Negative for cough, sputum production, shortness of breath and wheezing. Cardiovascular: Negative for chest pain, palpitations and leg swelling. Gastrointestinal: Positive for abdominal distention, abdominal pain, constipation and flatus.  Negative for blood in stool, melena, nausea and vomiting. Genitourinary: Negative for dysuria, hematuria and urgency. Musculoskeletal: Negative for back pain, joint pain and myalgias. Skin:        Wart on the right hand / left leg mole   Neurological: Negative for dizziness, tingling, focal weakness, weakness and headaches. Endo/Heme/Allergies: Negative for environmental allergies. Psychiatric/Behavioral: Negative for depression, memory loss and suicidal ideas. The patient is not nervous/anxious and does not have insomnia. Visit Vitals  /87   Pulse 72   Temp 97 °F (36.1 °C) (Oral)   Resp 16   Ht 5' 8\" (1.727 m)   Wt 144 lb (65.3 kg)   SpO2 99%   BMI 21.90 kg/m²       Physical Exam   Constitutional: She is oriented to person, place, and time. She appears well-developed and well-nourished. No distress. HENT:   Head: Normocephalic and atraumatic. Right Ear: External ear normal.   Left Ear: External ear normal.   Mouth/Throat: Oropharynx is clear and moist. No oropharyngeal exudate. Eyes: Pupils are equal, round, and reactive to light. EOM are normal. No scleral icterus. Neck: Normal range of motion. No thyromegaly present. Cardiovascular: Normal rate, regular rhythm and normal heart sounds. Pulmonary/Chest: Effort normal and breath sounds normal. No respiratory distress. She has no wheezes. Abdominal: Soft. Bowel sounds are normal. She exhibits no distension. There is no tenderness. Lymphadenopathy:     She has no cervical adenopathy. Neurological: She is alert and oriented to person, place, and time. Psychiatric: She has a normal mood and affect.        ASSESSMENT and PLAN  Recurrent Constipation :  - discussed reordering x-ray abdomen   - please continue to take laxatives and high fiber diet  - please continue fruit and vegetables   - drink plenty of water  - list has been given for gastroenterologists   Mole and wart :  - referral to dermatology and list has been given

## 2019-08-08 NOTE — PROGRESS NOTES
Patient is here for hospital f/u for constipation. She also has a wart and mole she would like to discuss. 1. Have you been to the ER, urgent care clinic since your last visit? Hospitalized since your last visit? yes    2. Have you seen or consulted any other health care providers outside of the [address] [place] [address] since your last visit? Include any pap smears or colon screening.  no

## 2019-08-13 ENCOUNTER — TELEPHONE (OUTPATIENT)
Dept: FAMILY MEDICINE CLINIC | Age: 44
End: 2019-08-13

## 2019-08-13 DIAGNOSIS — K59.00 CONSTIPATION, UNSPECIFIED CONSTIPATION TYPE: Primary | ICD-10-CM

## 2019-08-13 DIAGNOSIS — R93.5 ABNORMAL ABDOMINAL X-RAY: ICD-10-CM

## 2020-02-09 NOTE — PROGRESS NOTES
PHYSICAL THERAPY - DAILY TREATMENT NOTE - AQUATICS    Patient Name: Erum Noriega        Date: 2019  : 1975   YES Patient  Verified  Visit #:   10   of   12  Insurance: Payor: Otf Vieyra / Plan: [id number] / Product Type: Managed Care Medicaid /      In time: 2:00 Out time: 2:55   Total Treatment Time: 55     Medicare/BC[employer] [place] Time Tracking (below)   Total Timed Codes (min):  na 1:1 Treatment Time:  na     TREATMENT AREA =   Bilateral knee pain [M25.561, M25.562]  Chronic left-sided low back pain without sciatica [M54.5, G89.29]    SUBJECTIVE    Pain Level (on 0 to 10 scale):  4  / 10   Medication Changes/New allergies or changes in medical history, any new surgeries or procedures? NO    If yes, update Summary List   Subjective Functional Status/Changes:  []  No changes reported     Pt states she would be interested in transitioning to land therapy as long as her insurance will cover it and she is able to get a schedule that works with her job. OBJECTIVE  55 min Therapeutic Exercise:   [x]  Aquatic Therapy   Rationale:      increase strength, increase ROM, and improve balance to improve the patients ability to perform ADL's and ambulate with less pain and increase activity tolerance.       [x]   Patient Education:  Continue Aquatic Therapy and HEP as instructed     UE exercise resistance:                                                  LE exercises:                                                                 [] none                                                                             []  thera-band resistance       [] small water weights                                                   [] no UE support       [x] medium water weights                                              [x]  1 UE support        [] large water weights                                                   [x]  2 UE support          [] back supported on wall       [] thera-band SLS UE support:                      [] both UE                          [] 1 UE       [] 1 finger/fingers       []  none       [] EC     Post Treatment Pain Level (on 0 to 10) scale:   4  / 10     Other Pt demo's difficulty stabilizing in deeper water with performing UE ex, pt with improved form when moved back to shallower water. Cole Pepper for posture and form. ASSESSMENT    Assessment/Changes in Function:     Pt continues to demo decreased core strength as evidenced by difficulty with stabilization in water with therapeutic activity. []  See Progress Note/Recertification   Patient will continue to benefit from skilled PT services to analyze,, cue,, progress,, modify,, demonstrate,, instruct, and address, movement patterns,, therapeutic interventions,, postural abnormalities,, soft tissue restrictions,, ROM,, strength,, functional mobility,, body mechanics/ergonomics, and home and community integration, to attain remaining goals. Progress toward goals / Updated goals:    1. Patient will demonstrate independence with aquatic therapy program. Cole Pepper for form, discussed transition to land PT as appropriate. 2. Patient will score greater than or equal to 71/100 on FOTO (lumbar spine) to indicate improved function. 3. Patient will score greater than or equal to 61/100 on FOTO (knee) to indicate improved function.      PLAN    []  Upgrade activities as tolerated YES Continue plan of care   []  Discharge due to :    []  Other:      Therapist: Lucero Marcano PTA    Date: 4/9/2019 Time: 4:38 PM     Future Appointments   Date Time Provider Naeem Ramírez   4/11/2019  2:00 PM [facility] AT [address]   4/16/2019  2:00 PM [address]   4/18/2019  2:00 PM [address] [place] [address] Drive   4/23/2019  2:00 PM [address]   4/25/2019  2:00 PM [address] [place] [address] Drive   4/30/2019  2:00 PM [address] [phone]

## 2021-08-21 PROBLEM — R09.02 HYPOXIA: Status: ACTIVE | Noted: 2021-08-21

## 2021-08-21 PROBLEM — J12.82 PNEUMONIA DUE TO COVID-19 VIRUS: Status: ACTIVE | Noted: 2021-08-21

## 2021-08-21 PROBLEM — U07.1 PNEUMONIA DUE TO COVID-19 VIRUS: Status: ACTIVE | Noted: 2021-08-21

## 2022-03-18 PROBLEM — R09.02 HYPOXIA: Status: ACTIVE | Noted: 2021-08-21

## 2022-03-19 PROBLEM — J12.82 PNEUMONIA DUE TO COVID-19 VIRUS: Status: ACTIVE | Noted: 2021-08-21

## 2022-03-19 PROBLEM — U07.1 PNEUMONIA DUE TO COVID-19 VIRUS: Status: ACTIVE | Noted: 2021-08-21

## 2023-01-31 RX ORDER — ALBUTEROL SULFATE 90 UG/1
2 AEROSOL, METERED RESPIRATORY (INHALATION) EVERY 4 HOURS PRN
COMMUNITY
Start: 2021-08-24

## 2023-01-31 RX ORDER — DEXAMETHASONE 4 MG/1
TABLET ORAL
COMMUNITY
Start: 2021-08-24

## 2023-01-31 RX ORDER — ACETAMINOPHEN 325 MG/1
TABLET ORAL EVERY 4 HOURS PRN
COMMUNITY